# Patient Record
Sex: FEMALE | Race: WHITE | NOT HISPANIC OR LATINO
[De-identification: names, ages, dates, MRNs, and addresses within clinical notes are randomized per-mention and may not be internally consistent; named-entity substitution may affect disease eponyms.]

---

## 2017-08-11 ENCOUNTER — APPOINTMENT (OUTPATIENT)
Dept: PULMONOLOGY | Facility: CLINIC | Age: 72
End: 2017-08-11
Payer: MEDICARE

## 2017-08-11 VITALS
RESPIRATION RATE: 14 BRPM | BODY MASS INDEX: 17.67 KG/M2 | OXYGEN SATURATION: 95 % | HEIGHT: 60 IN | DIASTOLIC BLOOD PRESSURE: 70 MMHG | HEART RATE: 79 BPM | WEIGHT: 90 LBS | SYSTOLIC BLOOD PRESSURE: 110 MMHG

## 2017-08-11 DIAGNOSIS — Z82.5 FAMILY HISTORY OF ASTHMA AND OTHER CHRONIC LOWER RESPIRATORY DISEASES: ICD-10-CM

## 2017-08-11 DIAGNOSIS — Z86.39 PERSONAL HISTORY OF OTHER ENDOCRINE, NUTRITIONAL AND METABOLIC DISEASE: ICD-10-CM

## 2017-08-11 PROCEDURE — 94010 BREATHING CAPACITY TEST: CPT

## 2017-08-11 PROCEDURE — 94727 GAS DIL/WSHOT DETER LNG VOL: CPT

## 2017-08-11 PROCEDURE — 71020: CPT

## 2017-08-11 PROCEDURE — 99205 OFFICE O/P NEW HI 60 MIN: CPT | Mod: 25

## 2017-08-11 PROCEDURE — 94729 DIFFUSING CAPACITY: CPT

## 2017-08-11 RX ORDER — MOMETASONE FUROATE 1 MG/G
0.1 CREAM TOPICAL
Qty: 15 | Refills: 0 | Status: ACTIVE | COMMUNITY
Start: 2017-06-01

## 2017-08-11 RX ORDER — ATORVASTATIN CALCIUM 10 MG/1
10 TABLET, FILM COATED ORAL
Qty: 90 | Refills: 0 | Status: ACTIVE | COMMUNITY
Start: 2017-02-15

## 2017-09-10 ENCOUNTER — FORM ENCOUNTER (OUTPATIENT)
Age: 72
End: 2017-09-10

## 2017-09-11 ENCOUNTER — APPOINTMENT (OUTPATIENT)
Dept: CT IMAGING | Facility: CLINIC | Age: 72
End: 2017-09-11
Payer: MEDICARE

## 2017-09-11 ENCOUNTER — OUTPATIENT (OUTPATIENT)
Dept: OUTPATIENT SERVICES | Facility: HOSPITAL | Age: 72
LOS: 1 days | End: 2017-09-11
Payer: MEDICARE

## 2017-09-11 DIAGNOSIS — Z00.8 ENCOUNTER FOR OTHER GENERAL EXAMINATION: ICD-10-CM

## 2017-09-11 PROCEDURE — 71250 CT THORAX DX C-: CPT

## 2017-09-11 PROCEDURE — 71250 CT THORAX DX C-: CPT | Mod: 26

## 2017-10-23 ENCOUNTER — APPOINTMENT (OUTPATIENT)
Dept: PULMONOLOGY | Facility: CLINIC | Age: 72
End: 2017-10-23

## 2018-02-15 ENCOUNTER — RX RENEWAL (OUTPATIENT)
Age: 73
End: 2018-02-15

## 2018-02-15 RX ORDER — IPRATROPIUM BROMIDE 42 UG/1
0.06 SPRAY NASAL
Qty: 15 | Refills: 0 | Status: ACTIVE | COMMUNITY
Start: 2017-08-11 | End: 1900-01-01

## 2019-08-18 ENCOUNTER — FORM ENCOUNTER (OUTPATIENT)
Age: 74
End: 2019-08-18

## 2019-08-19 ENCOUNTER — APPOINTMENT (OUTPATIENT)
Dept: CT IMAGING | Facility: CLINIC | Age: 74
End: 2019-08-19
Payer: MEDICARE

## 2019-08-19 ENCOUNTER — OUTPATIENT (OUTPATIENT)
Dept: OUTPATIENT SERVICES | Facility: HOSPITAL | Age: 74
LOS: 1 days | End: 2019-08-19
Payer: MEDICARE

## 2019-08-19 DIAGNOSIS — Z00.8 ENCOUNTER FOR OTHER GENERAL EXAMINATION: ICD-10-CM

## 2019-08-19 PROCEDURE — 71250 CT THORAX DX C-: CPT | Mod: 26

## 2019-08-19 PROCEDURE — 71250 CT THORAX DX C-: CPT

## 2019-08-26 ENCOUNTER — NON-APPOINTMENT (OUTPATIENT)
Age: 74
End: 2019-08-26

## 2019-08-26 ENCOUNTER — APPOINTMENT (OUTPATIENT)
Dept: PULMONOLOGY | Facility: CLINIC | Age: 74
End: 2019-08-26
Payer: MEDICARE

## 2019-08-26 VITALS
RESPIRATION RATE: 16 BRPM | BODY MASS INDEX: 19.41 KG/M2 | WEIGHT: 90 LBS | OXYGEN SATURATION: 98 % | DIASTOLIC BLOOD PRESSURE: 70 MMHG | SYSTOLIC BLOOD PRESSURE: 110 MMHG | HEART RATE: 77 BPM | HEIGHT: 57 IN

## 2019-08-26 DIAGNOSIS — R93.89 ABNORMAL FINDINGS ON DIAGNOSTIC IMAGING OF OTHER SPECIFIED BODY STRUCTURES: ICD-10-CM

## 2019-08-26 DIAGNOSIS — J31.0 CHRONIC RHINITIS: ICD-10-CM

## 2019-08-26 PROCEDURE — 99214 OFFICE O/P EST MOD 30 MIN: CPT | Mod: 25

## 2019-08-26 PROCEDURE — 94010 BREATHING CAPACITY TEST: CPT

## 2019-08-26 PROCEDURE — 95012 NITRIC OXIDE EXP GAS DETER: CPT

## 2019-08-26 NOTE — PHYSICAL EXAM
[Normal Appearance] : normal appearance [General Appearance - Well Developed] : well developed [Well Groomed] : well groomed [General Appearance - Well Nourished] : well nourished [No Deformities] : no deformities [General Appearance - In No Acute Distress] : no acute distress [Eyelids - No Xanthelasma] : the eyelids demonstrated no xanthelasmas [Normal Oropharynx] : normal oropharynx [Normal Conjunctiva] : the conjunctiva exhibited no abnormalities [Neck Appearance] : the appearance of the neck was normal [Jugular Venous Distention Increased] : there was no jugular-venous distention [Neck Cervical Mass (___cm)] : no neck mass was observed [Thyroid Diffuse Enlargement] : the thyroid was not enlarged [Thyroid Nodule] : there were no palpable thyroid nodules [Heart Rate And Rhythm] : heart rate and rhythm were normal [Heart Sounds] : normal S1 and S2 [Murmurs] : no murmurs present [Respiration, Rhythm And Depth] : normal respiratory rhythm and effort [Auscultation Breath Sounds / Voice Sounds] : lungs were clear to auscultation bilaterally [Exaggerated Use Of Accessory Muscles For Inspiration] : no accessory muscle use [Abdomen Soft] : soft [Abdomen Tenderness] : non-tender [Abdomen Mass (___ Cm)] : no abdominal mass palpated [Abnormal Walk] : normal gait [Cyanosis, Localized] : no localized cyanosis [Gait - Sufficient For Exercise Testing] : the gait was sufficient for exercise testing [Nail Clubbing] : no clubbing of the fingernails [Deep Tendon Reflexes (DTR)] : deep tendon reflexes were 2+ and symmetric [Petechial Hemorrhages (___cm)] : no petechial hemorrhages [Sensation] : the sensory exam was normal to light touch and pinprick [No Focal Deficits] : no focal deficits [Impaired Insight] : insight and judgment were intact [Oriented To Time, Place, And Person] : oriented to person, place, and time [Affect] : the affect was normal [Skin Turgor] : normal skin turgor [III] : III [Skin Color & Pigmentation] : normal skin color and pigmentation [] : no rash [No Venous Stasis] : no venous stasis [Skin Lesions] : no skin lesions [No Skin Ulcers] : no skin ulcer [No Xanthoma] : no  xanthoma was observed [FreeTextEntry1] : I:E 1:3; clear

## 2019-08-26 NOTE — HISTORY OF PRESENT ILLNESS
[FreeTextEntry1] : Ms. Mccullough is a 74 year old female with a history of abnormal CXR/chest CT/PFTs, allergic / gustatory rhinitis, snoring, and mild intermittent asthma presenting to the office today for a follow up visit. Her chief complaint is sinus issues / dry eyes. \par -She states that she has generally been feeling well \par -she has been exercising regularly, doing cardio and weight lifting\par -she reports that the Ipratropium bromide nasal spray was drying out her sinuses and helped her gustatory rhinitis, however it was also causing her eyes to become very dry, as per her ophthalmologist. she has since stopped the nasal sprays, although she still gets frequent dry eyes and her sinuses are still runny\par -her  states that she still snores\par -she reports that she will get a cold that drops to her chest about twice per year\par -she denies any headaches, nausea, vomiting, fever, chills, sweats, chest pain, chest pressure, diarrhea, constipation, dysphagia, dizziness, leg swelling, leg pain, itchy ears, heartburn, reflux, or sour taste in the mouth.

## 2019-08-26 NOTE — ADDENDUM
[FreeTextEntry1] : All medical record entries made by seven Baltazar were at Dr. Maximiliano Baptiste's, direction and personally dictated by me on 08/26/2019. I have reviewed the chart and agree that the record accurately reflects my personal performance of the history, physical exam, assessment and plan. I have also personally directed, reviewed, and agree with the discharge instructions.

## 2019-08-26 NOTE — ASSESSMENT
[FreeTextEntry1] : Ms. Mccullough is a 74 year old female with a history of HLD, anxiety, benign chest CT, non-allergic rhinitis, non-smoker who now comes in for a follow up pulmonary evaluation for asthma (likely). \par \par Ms. Mccullough's "COPD" is felt to be related to mild intermittent asthma in someone with under allergies.\par \par problem 1: mild intermittent asthma\par -no regular therapy indicated \par -add Breo Ellipta 200 at 1 inhalation, prn\par -Asthma is  believed to be caused by inherited (genetic) and environmental factor, but its exact cause is unknown. Asthma may be triggered by allergens, lung infections, or irritants in the air. Asthma triggers are different for each person\par -Inhaler technique reviewed as well as oral hygiene techniques reviewed with patient. Avoidance of cold air, extremes of temperature, rescue inhaler should be used before exercise. Order of medication reviewed with patient. Recommended use of a cool mist humidifier in the bedroom. \par \par problem 2: allergic rhinitis \par -recommended to use Xlear nasal saline spray \par -recommended Flonase 1 sniff/nostril BID \par -recommended Zaditor eye drops \par -Environmental measures for allergies were encouraged including mattress and pillow cover, air purifier, and environmental controls.\par \par problem 3: gustatory rhinitis\par -recommended Atrovent 0.03% at 1 sniff/nostril BID\par -Gustatory rhinitis is an overreaction of the vagal nerve in the nervous system upon ingesting certain foods including, but not limited to, hot food, spicy food, and alcohol intake. Gustatory rhinitis symptoms include runny, usually clear, watery discharge, which is a result dilation of blood vessels. Other symptoms can include nasal congestion and sneezing. Treatment options include corticosteroid nasal sprays, mucolytic drugs, decongestant nasal sprays, and anticholinergic agents as well as avoidance of triggers. \par \par problem 4: abnormal chest CT - ([id number])\par -the CT scan will be re-reviewed by Dr. Lana Hamm\par -s/p repeat chest CT in August 2019 -(stable) - Next: 8/2020\par -needs to follow ground-glass abnormality \par \par problem 5: snoring - ?OSAS\par -recommended to use "Oxy Aid" / "Chin Strap" \par Sleep apnea is associated with adverse clinical consequences which an affect most organ systems. Cardiovascular disease risk includes arrhythmias, atrial fibrillation, hypertension, coronary artery disease, and stroke. Metabolic disorders include diabetes type 2, non-alcoholic fatty liver disease. Mood disorder especially depression; and cognitive decline especially in the elderly. Associations with chronic reflux/Mayfield’s esophagus some but not all inclusive. \par -Reasons include arousal consistent with hypopnea; respiratory events most prominent in REM sleep or supine position; therefore sleep staging and body position are important for accurate diagnosis and estimation of AHI.\par -Good sleep hygiene was encouraged including avoiding watching television an hour before bed, keeping caffeine at a low, avoiding reading, television, or anything, in bed, no drinking any liquids three hours before bedtime, and only getting into bed when tired and ready for sleep. \par \par problem 6: health maintenance \par -recommended yearly flu shot - 2018\par -recommended strep pneumonia vaccines: Prevnar-13 vaccine, followed by Pneumo vaccine 23 one year following\par -recommended early intervention for Upper Respiratory Infections (URIs)\par -recommended regular osteoporosis evaluations\par -recommended early dermatological evaluations\par -recommended after the age of 50 to the age of 70, colonoscopy every 5 years \par \par \par F/U in 6-8 weeks \par She is encouraged to call with any changes, concerns, or questions.

## 2019-08-26 NOTE — PROCEDURE
[FreeTextEntry1] : Chest CT (August 19, 2019) reveals Central airways are patent. Mild bronchiectasis. Mild bronchial wall \par thickening of the right lower lobe subsegmental bronchi. Bilateral peripheral areas of branching \par opacity, some of which is in the same distribution and unchanged since 9/11/2017 and likely \par related to mucoid impaction. Previously described 4 mm pulmonary nodules are unchanged. 5 mm \par left lower lobe pulmonary nodule (series 2, image 93) is unchanged as well.\par \par PFT - spi reveals normal flows; FEV1 is 1.89 which is 125% of predicted, normal flow volume loop \par \par FENO was 13; a normal value being less than 25\par \par Fractional exhaled nitric oxide (FENO) is regarded as a simple, noninvasive method for assessing eosinophilic airway inflammation. Produced by a variety of cells within the lung, nitric oxide (NO) concentrations are generally low in healthy individuals. However, high concentrations of NO appear to be involved in nonspecific host defense mechanisms and chronic inflammatory diseases such as asthma. The American Thor[employer] Society (ATS) therefore has recommended using FENO to aid in the diagnosis and monitoring of eosinophilic airway inflammation and asthma, and for identifying steroid responsive individuals whose chronic respiratory symptoms may be caused by airway inflammation.

## 2019-08-26 NOTE — REASON FOR VISIT
[Follow-Up] : a follow-up visit [Spouse] : spouse [FreeTextEntry1] : abnormal CXR/chest CT/PFTs, allergic / gustatory rhinitis, snoring and mild intermittent asthma

## 2020-06-18 ENCOUNTER — RX RENEWAL (OUTPATIENT)
Age: 75
End: 2020-06-18

## 2020-09-07 ENCOUNTER — RX RENEWAL (OUTPATIENT)
Age: 75
End: 2020-09-07

## 2020-09-07 RX ORDER — IPRATROPIUM BROMIDE 21 UG/1
0.03 SPRAY NASAL 3 TIMES DAILY
Qty: 90 | Refills: 1 | Status: ACTIVE | COMMUNITY
Start: 2019-08-26 | End: 1900-01-01

## 2020-09-08 ENCOUNTER — RESULT REVIEW (OUTPATIENT)
Age: 75
End: 2020-09-08

## 2020-09-08 ENCOUNTER — OUTPATIENT (OUTPATIENT)
Dept: OUTPATIENT SERVICES | Facility: HOSPITAL | Age: 75
LOS: 1 days | End: 2020-09-08
Payer: MEDICARE

## 2020-09-08 ENCOUNTER — APPOINTMENT (OUTPATIENT)
Dept: CT IMAGING | Facility: CLINIC | Age: 75
End: 2020-09-08
Payer: MEDICARE

## 2020-09-08 DIAGNOSIS — Z00.8 ENCOUNTER FOR OTHER GENERAL EXAMINATION: ICD-10-CM

## 2020-09-08 PROCEDURE — 71250 CT THORAX DX C-: CPT

## 2020-09-08 PROCEDURE — 71250 CT THORAX DX C-: CPT | Mod: 26

## 2020-10-19 ENCOUNTER — APPOINTMENT (OUTPATIENT)
Dept: PULMONOLOGY | Facility: CLINIC | Age: 75
End: 2020-10-19
Payer: MEDICARE

## 2020-10-19 VITALS
TEMPERATURE: 97.2 F | HEART RATE: 81 BPM | BODY MASS INDEX: 18.99 KG/M2 | WEIGHT: 88 LBS | HEIGHT: 57 IN | RESPIRATION RATE: 16 BRPM | DIASTOLIC BLOOD PRESSURE: 60 MMHG | OXYGEN SATURATION: 98 % | SYSTOLIC BLOOD PRESSURE: 110 MMHG

## 2020-10-19 DIAGNOSIS — R06.83 SNORING: ICD-10-CM

## 2020-10-19 DIAGNOSIS — R94.2 ABNORMAL RESULTS OF PULMONARY FUNCTION STUDIES: ICD-10-CM

## 2020-10-19 DIAGNOSIS — R93.89 ABNORMAL FINDINGS ON DIAGNOSTIC IMAGING OF OTHER SPECIFIED BODY STRUCTURES: ICD-10-CM

## 2020-10-19 DIAGNOSIS — J30.9 ALLERGIC RHINITIS, UNSPECIFIED: ICD-10-CM

## 2020-10-19 DIAGNOSIS — J45.20 MILD INTERMITTENT ASTHMA, UNCOMPLICATED: ICD-10-CM

## 2020-10-19 PROCEDURE — 99214 OFFICE O/P EST MOD 30 MIN: CPT

## 2020-10-19 RX ORDER — DOXYCYCLINE HYCLATE 100 MG/1
100 CAPSULE ORAL
Qty: 20 | Refills: 0 | Status: DISCONTINUED | COMMUNITY
Start: 2020-05-13

## 2020-10-19 RX ORDER — MUPIROCIN 20 MG/G
2 OINTMENT TOPICAL
Qty: 22 | Refills: 0 | Status: ACTIVE | COMMUNITY
Start: 2020-05-05

## 2020-10-19 RX ORDER — LORAZEPAM 0.5 MG/1
0.5 TABLET ORAL
Qty: 20 | Refills: 0 | Status: ACTIVE | COMMUNITY
Start: 2020-06-19

## 2020-10-19 NOTE — ADDENDUM
[FreeTextEntry1] : Documented by Akanksha Fitzpatrick acting as a scribe for Dr. Maximiliano Baptiste on 10/19/2020 \par \par All medical record entries made by the Scribe were at my, Dr. Maximiliano Baptiste's, direction and personally dictated by me on 10/19/2020 . I have reviewed the chart and agree that the record accurately reflects my personal performance of the history, physical exam, assessment and plan. I have also personally directed, reviewed, and agree with the discharge instructions.

## 2020-10-19 NOTE — ASSESSMENT
[FreeTextEntry1] : Ms. Mccullough is a 75 year old female with a history of HLD, anxiety, benign chest CT, non-allergic rhinitis, non-smoker who now comes in for a follow up pulmonary evaluation for asthma (likely). - stable \par \par Ms. Mccullough's "COPD" is felt to be related to mild intermittent asthma in someone with under allergies.\par \par problem 1: mild intermittent asthma\par -no regular therapy indicated \par -add Breo Ellipta 200 at 1 inhalation, prn\par -Asthma is  believed to be caused by inherited (genetic) and environmental factor, but its exact cause is unknown. Asthma may be triggered by allergens, lung infections, or irritants in the air. Asthma triggers are different for each person\par -Inhaler technique reviewed as well as oral hygiene techniques reviewed with patient. Avoidance of cold air, extremes of temperature, rescue inhaler should be used before exercise. Order of medication reviewed with patient. Recommended use of a cool mist humidifier in the bedroom. \par \par problem 2: allergic rhinitis \par -recommended to use Xlear nasal saline spray \par -recommended Flonase 1 sniff/nostril BID \par -recommended Zaditor eye drops \par -Environmental measures for allergies were encouraged including mattress and pillow cover, air purifier, and environmental controls.\par \par problem 3: gustatory rhinitis\par -recommended Atrovent 0.03% at 1 sniff/nostril BID\par -Gustatory rhinitis is an overreaction of the vagal nerve in the nervous system upon ingesting certain foods including, but not limited to, hot food, spicy food, and alcohol intake. Gustatory rhinitis symptoms include runny, usually clear, watery discharge, which is a result dilation of blood vessels. Other symptoms can include nasal congestion and sneezing. Treatment options include corticosteroid nasal sprays, mucolytic drugs, decongestant nasal sprays, and anticholinergic agents as well as avoidance of triggers. \par \par problem 4: abnormal chest CT - ([id number])\par -the CT scan will be re-reviewed by Dr. Lana Hamm\par -s/p repeat chest CT - Next: 8/2020 - next 9/2021\par -needs to follow ground-glass abnormality \par \par problem 5: snoring - ?OSAS\par -recommended to use "Oxy Aid" / "Chin Strap" \par Sleep apnea is associated with adverse clinical consequences which an affect most organ systems. Cardiovascular disease risk includes arrhythmias, atrial fibrillation, hypertension, coronary artery disease, and stroke. Metabolic disorders include diabetes type 2, non-alcoholic fatty liver disease. Mood disorder especially depression; and cognitive decline especially in the elderly. Associations with chronic reflux/Mayfield’s esophagus some but not all inclusive. \par -Reasons include arousal consistent with hypopnea; respiratory events most prominent in REM sleep or supine position; therefore sleep staging and body position are important for accurate diagnosis and estimation of AHI.\par -Good sleep hygiene was encouraged including avoiding watching television an hour before bed, keeping caffeine at a low, avoiding reading, television, or anything, in bed, no drinking any liquids three hours before bedtime, and only getting into bed when tired and ready for sleep. \par \par Problem 7: Health Maintenance/COVID19 Precautions:\par - Clean your hands often. Wash your hands often with soap and water for at least 20 seconds, especially after blowing your nose, coughing, or sneezing, or having been in a public place.\par - If soap and water are not available, use a hand  that contains at least 60% alcohol.\par - To the extent possible, avoid touching high-touch surfaces in public places - elevator buttons, door handles, handrails, handshaking with people, etc. Use a tissue or your sleeve to cover your hand or finger if you must touch something.\par - Wash your hands after touching surfaces in public places.\par Immune Support Recommendations:\par -OTC Vitamin C 500mg BID \par -OTC Quercetin 250-500mg BID \par -OTC Zinc 75-100mg per day \par -OTC Melatonin 1or 2mg a night \par -OTC Vitamin D 1-4000mg per day \par -OTC Tonic Water 8oz per day\par \par \par problem 8: health maintenance \par - recommended CoQ10 supplements \par -recommended yearly flu shot - 2020\par -recommended strep pneumonia vaccines: Prevnar-13 vaccine, followed by Pneumo vaccine 23 one year following (completed)\par -recommended early intervention for Upper Respiratory Infections (URIs)\par -recommended regular osteoporosis evaluations\par -recommended early dermatological evaluations\par -recommended after the age of 50 to the age of 70, colonoscopy every 5 years \par \par \par F/U in 6 months \par She is encouraged to call with any changes, concerns, or questions.

## 2020-10-19 NOTE — PHYSICAL EXAM
[Normal Appearance] : normal appearance [General Appearance - Well Developed] : well developed [No Deformities] : no deformities [Well Groomed] : well groomed [General Appearance - Well Nourished] : well nourished [General Appearance - In No Acute Distress] : no acute distress [Normal Conjunctiva] : the conjunctiva exhibited no abnormalities [Eyelids - No Xanthelasma] : the eyelids demonstrated no xanthelasmas [Normal Oropharynx] : normal oropharynx [Neck Cervical Mass (___cm)] : no neck mass was observed [Neck Appearance] : the appearance of the neck was normal [Jugular Venous Distention Increased] : there was no jugular-venous distention [Heart Rate And Rhythm] : heart rate and rhythm were normal [Thyroid Diffuse Enlargement] : the thyroid was not enlarged [Thyroid Nodule] : there were no palpable thyroid nodules [Heart Sounds] : normal S1 and S2 [Murmurs] : no murmurs present [Exaggerated Use Of Accessory Muscles For Inspiration] : no accessory muscle use [Respiration, Rhythm And Depth] : normal respiratory rhythm and effort [Auscultation Breath Sounds / Voice Sounds] : lungs were clear to auscultation bilaterally [Abdomen Tenderness] : non-tender [Abdomen Soft] : soft [Abdomen Mass (___ Cm)] : no abdominal mass palpated [Abnormal Walk] : normal gait [Gait - Sufficient For Exercise Testing] : the gait was sufficient for exercise testing [Nail Clubbing] : no clubbing of the fingernails [Petechial Hemorrhages (___cm)] : no petechial hemorrhages [Cyanosis, Localized] : no localized cyanosis [No Venous Stasis] : no venous stasis [Skin Lesions] : no skin lesions [No Skin Ulcers] : no skin ulcer [No Xanthoma] : no  xanthoma was observed [Sensation] : the sensory exam was normal to light touch and pinprick [Deep Tendon Reflexes (DTR)] : deep tendon reflexes were 2+ and symmetric [No Focal Deficits] : no focal deficits [Oriented To Time, Place, And Person] : oriented to person, place, and time [Skin Color & Pigmentation] : normal skin color and pigmentation [Affect] : the affect was normal [Impaired Insight] : insight and judgment were intact [] : no rash [Skin Turgor] : normal skin turgor [II] : II [FreeTextEntry1] : I:E 1:3; clear

## 2020-10-19 NOTE — HISTORY OF PRESENT ILLNESS
[FreeTextEntry1] : Ms. Mccullough is a 75 year old female with a history of abnormal CXR/chest CT/PFTs, allergic / gustatory rhinitis, snoring, and mild intermittent asthma presenting to the office today for a follow up visit. Her chief complaint is\par - she has been feeling good in general\par - she has been using a nasal spray she was given which has been helping\par - she has been exercising using weights and cardio\par - no chest pain / pressure \par - no SOB \par - she has been eating well\par - weight has been stable \par - no palpitations \par - she has been sleeping 6-7 hours a night \par - denies taking any new medications, vitamins, or supplements. \par - she has been taking Zoloft and Lipitor, and a baby aspirin\par - no wheezing \par - She  denies any visual issues, headaches, nausea, vomiting, fever, chills, sweats, chest pains, chest pressure, diarrhea, constipation, dysphagia, myalgia, dizziness, leg swelling, leg pain, itchy eyes, itchy ears, heartburn, reflux, or sour taste in the mouth.

## 2021-12-03 ENCOUNTER — NON-APPOINTMENT (OUTPATIENT)
Age: 76
End: 2021-12-03